# Patient Record
Sex: MALE | Race: WHITE | Employment: FULL TIME | URBAN - METROPOLITAN AREA
[De-identification: names, ages, dates, MRNs, and addresses within clinical notes are randomized per-mention and may not be internally consistent; named-entity substitution may affect disease eponyms.]

---

## 2021-07-15 ENCOUNTER — HOSPITAL ENCOUNTER (EMERGENCY)
Age: 31
Discharge: HOME | End: 2021-07-15
Attending: EMERGENCY MEDICINE
Payer: COMMERCIAL

## 2021-07-15 VITALS
DIASTOLIC BLOOD PRESSURE: 71 MMHG | SYSTOLIC BLOOD PRESSURE: 142 MMHG | RESPIRATION RATE: 16 BRPM | HEIGHT: 73 IN | OXYGEN SATURATION: 96 % | BODY MASS INDEX: 26.67 KG/M2 | WEIGHT: 201.25 LBS | HEART RATE: 59 BPM | TEMPERATURE: 98.2 F

## 2021-07-15 DIAGNOSIS — K05.30 PERICORONITIS: Primary | ICD-10-CM

## 2021-07-15 DIAGNOSIS — K08.89 ODONTALGIA: ICD-10-CM

## 2021-07-15 PROCEDURE — 99283 EMERGENCY DEPT VISIT LOW MDM: CPT

## 2021-07-15 RX ORDER — AMOXICILLIN 500 MG/1
500 TABLET, FILM COATED ORAL 3 TIMES DAILY
Qty: 30 TABLET | Refills: 0 | Status: SHIPPED | OUTPATIENT
Start: 2021-07-15 | End: 2021-07-25

## 2021-07-15 RX ORDER — IBUPROFEN 600 MG/1
600 TABLET ORAL EVERY 8 HOURS PRN
Qty: 20 TABLET | Refills: 0 | Status: SHIPPED | OUTPATIENT
Start: 2021-07-15

## 2021-07-15 ASSESSMENT — PAIN DESCRIPTION - LOCATION: LOCATION: MOUTH

## 2021-07-15 ASSESSMENT — PAIN SCALES - GENERAL: PAINLEVEL_OUTOF10: 6

## 2021-07-15 ASSESSMENT — PAIN DESCRIPTION - DESCRIPTORS: DESCRIPTORS: THROBBING

## 2021-07-15 ASSESSMENT — PAIN DESCRIPTION - ORIENTATION: ORIENTATION: RIGHT

## 2021-07-15 ASSESSMENT — PAIN DESCRIPTION - PAIN TYPE: TYPE: ACUTE PAIN

## 2021-07-15 NOTE — ED TRIAGE NOTES
Pt c/o right sided throat pain with swallowing, R sided swelling inside of mouth. Pt states it began overnight.

## 2021-07-15 NOTE — ED PROVIDER NOTES
TRIAGE CHIEF COMPLAINT:   Chief Complaint   Patient presents with    Dental Pain    Oral Swelling         HPI: Jess Khan is a 28 y.o. male who presents to the Emergency Department with complaint of right lower dental pain that started during the night. No known injury. Patient states the pain is at the most posterior right lower tooth. Several months ago he had the left lower third molar removed by his dentist.  He denies fever or chills. He states it is painful to open his mouth. He has no pain under the tongue. No specific sore throat. No fever or chills. Denies any neck pain, chest pain or shortness of breath. REVIEW OF SYSTEMS:  6 systems reviewed. Pertinent positives per HPI. Otherwise noted to be negative. Nursing notes reviewed and agree with above. Past medical/surgical history reviewed. MEDICATIONS   Patient's Medications    No medications on file         ALLERGIES No Known Allergies      BP (!) 142/71   Pulse 59   Temp 98.2 °F (36.8 °C) (Oral)   Resp 16   Ht 6' 1\" (1.854 m)   Wt 201 lb 4 oz (91.3 kg)   SpO2 96%   BMI 26.55 kg/m²   General:  No acute distress. Non toxic appearance  Head:   Normocephalic and atraumatic  Eyes:   Conjunctiva clear, GIDEON, EOM's intact. ENT:   There is no facial swelling. Minimal trismus noted secondary to pain. Posterior pharynx shows no exudate redness or swelling. The floor the mouth is normal.  There is no stridor. He swallows easily. He has evidence of pericoronitis of the right lower third molar with tenderness to palpation. No evidence of abscess or dental decay. Neck:   Supple. No adenopathy. No meningismus. Lungs/Chest:  No respiratory distress  CVS:   Regular rate and rhythm  Extremities:  Full range of motion  Skin:   No rashes or lesions to exposed skin  Neuro:  Alert and OX3. Speech clear and appropriate. No extremity weakness. Normal sensation in all extremities. No facial asymmetry.  Gait normal.  Psych:   Affect normal. Mood normal        RADIOLOGY      LAB      ED COURSE / MDM:  81-year-old male with pericoronitis of the right lower third molar presenting with pain. No respiratory distress. No neck pain, chest pain or shortness of breath. The floor the mouth is normal.  No visible abscess or decay. Tooth is tender to palpation with some hypertrophy of the gum over the tooth. Posterior pharynx is completely normal.  I recommended amoxicillin and frequent warm salt water gargles/rinses especially after eating to remove any food particles from around the tooth. Advised Tylenol or ibuprofen if needed for pain. Recommended he contact his dentist for follow-up as he may need removal of this tooth. I discussed with Puja Swati the results of evaluation in the Emergency Department, diagnosis, care and prognosis. The plan is to discharge to home. The patient is in agreement with the plan and questions have been answered. I also discussed with the patient and/or family the reasons which may require a return visit and the importance of follow-up care.        (Please note that portions of this note may have been completed with a voice recognition program.  Efforts were made to edit the dictation but occasionally words are mis-transcribed)      FINAL IMPRESSION:  1 --perichorionitis right lower third molar  2 --odontalgia     Nikko Sosa MD  07/15/21 1105

## 2024-01-24 ENCOUNTER — HOSPITAL ENCOUNTER (OUTPATIENT)
Dept: GENERAL RADIOLOGY | Age: 34
Discharge: HOME | End: 2024-01-24

## 2024-01-24 ENCOUNTER — HOSPITAL ENCOUNTER (OUTPATIENT)
Age: 34
Discharge: HOME | End: 2024-01-24

## 2024-01-24 DIAGNOSIS — G44.89 OTHER HEADACHE SYNDROME: ICD-10-CM

## 2024-01-24 DIAGNOSIS — S00.81XA ABRASION OF FOREHEAD, INITIAL ENCOUNTER: ICD-10-CM

## 2024-01-24 DIAGNOSIS — S00.83XA CONTUSION OF FOREHEAD, INITIAL ENCOUNTER: ICD-10-CM

## 2024-01-24 PROCEDURE — 70150 X-RAY EXAM OF FACIAL BONES: CPT

## 2024-01-29 ENCOUNTER — OFFICE VISIT (OUTPATIENT)
Dept: ORTHOPEDIC SURGERY | Age: 34
End: 2024-01-29
Payer: COMMERCIAL

## 2024-01-29 VITALS — HEIGHT: 73 IN | WEIGHT: 216 LBS | BODY MASS INDEX: 28.63 KG/M2

## 2024-01-29 DIAGNOSIS — M79.601 RIGHT ARM PAIN: ICD-10-CM

## 2024-01-29 DIAGNOSIS — M77.01 MEDIAL EPICONDYLITIS OF ELBOW, RIGHT: Primary | ICD-10-CM

## 2024-01-29 PROCEDURE — 99204 OFFICE O/P NEW MOD 45 MIN: CPT | Performed by: EMERGENCY MEDICINE

## 2024-01-29 RX ORDER — METHYLPREDNISOLONE 4 MG/1
TABLET ORAL
Qty: 1 KIT | Refills: 0 | Status: SHIPPED | OUTPATIENT
Start: 2024-01-29

## 2024-01-29 RX ORDER — ATORVASTATIN CALCIUM 10 MG/1
10 TABLET, FILM COATED ORAL DAILY
COMMUNITY
Start: 2023-06-16

## 2024-01-29 RX ORDER — BUPROPION HYDROCHLORIDE 150 MG/1
150 TABLET ORAL DAILY
COMMUNITY
Start: 2023-06-09

## 2024-01-29 RX ORDER — OMEPRAZOLE 40 MG/1
40 CAPSULE, DELAYED RELEASE ORAL DAILY
COMMUNITY
Start: 2023-06-09

## 2024-01-29 ASSESSMENT — ENCOUNTER SYMPTOMS
SHORTNESS OF BREATH: 0
ABDOMINAL PAIN: 0

## 2024-01-29 NOTE — PROGRESS NOTES
NEW PATIENT VISIT  CC: Arm Pain (RIGHT ARM PAIN)    Referring Provider: No ref. provider found    HPI:    Pillo Luo is a 33 y.o. male who presents for evaluation of right elbow pain.  Patient is right-hand dominant.  He reports 1 month of symptoms.  He states that initially \"it felt like I pulled something.\"  However, the pain never really went away.  He states that the pain is worse when compared using a computer mouse.  He also reports worsening pain with any lifting.  He states that sometimes the pain goes from his elbow to his wrist.  He does occasionally get some skin sensitivity.  No numbness or tingling.  No skin color changes.  He has been taking naproxen occasionally, which does improve his symptoms.  He denies any neck pain.  No shoulder pain.  He does not think that he has any particular weakness but he thinks that he feels weak secondary to the pain.  He denies any other complaints.    Past Medical History:   Diagnosis Date    ADHD (attention deficit hyperactivity disorder)     Asthma        Social History  Works as a     Medications  Current Outpatient Medications   Medication Sig Dispense Refill    atorvastatin (LIPITOR) 10 MG tablet Take 1 tablet by mouth daily      buPROPion (WELLBUTRIN XL) 150 MG extended release tablet Take 1 tablet by mouth daily      omeprazole (PRILOSEC) 40 MG delayed release capsule Take 1 capsule by mouth daily      methylPREDNISolone (MEDROL, CHERELLE,) 4 MG tablet Take by mouth. 1 kit 0     No current facility-administered medications for this visit.       Allergies  No Known Allergies    Review of Systems:  Review of Systems   Constitutional:  Negative for activity change.   Respiratory:  Negative for shortness of breath.    Cardiovascular:  Negative for chest pain.   Gastrointestinal:  Negative for abdominal pain.   Musculoskeletal:  Negative for joint swelling and myalgias.        Medial elbow pain   Skin:  Negative for rash.   Allergic/Immunologic:

## 2024-01-29 NOTE — ASSESSMENT & PLAN NOTE
Patient is seen and examined in the clinic today.  He is right-hand dominant presenting with right medial elbow pain.  He reports pain worse with lifting.    On exam, he has tenderness to palpation over the medial epicondyles with pain at the medial epicondyles with resisted flexion at the wrist.    X-rays of the right elbow are negative for acute pathology.    Clinical presentation seems most likely consistent with medial epicondylitis.  We discussed multiple treatment options including NSAIDs, steroids, physical therapy, steroid injection.  Ultimately, patient was agreeable to a Medrol Dosepak.  Upon completion of the Medrol Dosepak, naproxen twice daily.  Formal physical therapy.  Follow-up in 2 weeks.

## 2024-01-29 NOTE — ASSESSMENT & PLAN NOTE
Patient is also describing symptoms of skin sensitivity over the medial forearm.    On exam, he does have a positive Tinel's of the cubital tunnel.    Concern for possible cubital tunnel.  Therefore, I would like to proceed with an EMG of the right upper extremity.

## 2024-02-26 ENCOUNTER — OFFICE VISIT (OUTPATIENT)
Dept: ORTHOPEDIC SURGERY | Age: 34
End: 2024-02-26
Payer: COMMERCIAL

## 2024-02-26 VITALS — HEIGHT: 73 IN | WEIGHT: 216 LBS | BODY MASS INDEX: 28.63 KG/M2

## 2024-02-26 DIAGNOSIS — M77.01 MEDIAL EPICONDYLITIS OF ELBOW, RIGHT: ICD-10-CM

## 2024-02-26 DIAGNOSIS — M79.601 RIGHT ARM PAIN: Primary | ICD-10-CM

## 2024-02-26 PROCEDURE — 4004F PT TOBACCO SCREEN RCVD TLK: CPT | Performed by: EMERGENCY MEDICINE

## 2024-02-26 PROCEDURE — G8484 FLU IMMUNIZE NO ADMIN: HCPCS | Performed by: EMERGENCY MEDICINE

## 2024-02-26 PROCEDURE — G8427 DOCREV CUR MEDS BY ELIG CLIN: HCPCS | Performed by: EMERGENCY MEDICINE

## 2024-02-26 PROCEDURE — G8419 CALC BMI OUT NRM PARAM NOF/U: HCPCS | Performed by: EMERGENCY MEDICINE

## 2024-02-26 PROCEDURE — 99213 OFFICE O/P EST LOW 20 MIN: CPT | Performed by: EMERGENCY MEDICINE

## 2024-02-26 RX ORDER — DICLOFENAC SODIUM 75 MG/1
75 TABLET, DELAYED RELEASE ORAL 2 TIMES DAILY
Qty: 60 TABLET | Refills: 3 | Status: SHIPPED | OUTPATIENT
Start: 2024-02-26

## 2024-02-26 NOTE — ASSESSMENT & PLAN NOTE
Patient is seen and examined in the clinic today.  He is presenting with persistent medial elbow pain despite Medrol Dosepak.  Mild improvement of symptoms with naproxen.  He has not been in physical therapy.    EMG negative for cubital tunnel.    At this time, we discussed potential workup and treatment options.  As he does get a little bit of relief with naproxen, I would like to start him on prescribed, scheduled Voltaren.  A prescription was sent to the pharmacy.  Formal physical therapy.  We discussed adding on a steroid injection.  He would like to hold off at this time.  However, given the duration of his symptoms, I would like to proceed with an MRI of the right elbow.

## 2024-02-26 NOTE — PROGRESS NOTES
FOLLOW UP VISIT    Chief Complaint   Patient presents with    Arm Pain     RIGHT ELBOW-EMG REVIEW       HPI:    Pillo Luo is a 33 y.o. male who presents for EMG review.     At the last visit on 1/29/2024, patient was presenting with acute right elbow pain.  Concern for medial epicondylitis.  However, he was having some symptoms concerning for cubital tunnel so he was sent for EMG.  He was started on a Medrol Dosepak followed by naproxen and physical therapy was ordered.    Since the last visit, Pillo Luo has noted persistent symptoms.  At this time, pain is 4 out of 10 in severity.  However, symptoms do get worse with use of the right upper extremity.  He is right-hand dominant.  This does seem to be affecting his work.  He occasionally takes naproxen, which \"takes the edge off.\"  He did not get much relief from the steroid.  He has not yet been in physical therapy.    Medical History  Patient's medications, allergies, past medical, surgical, social, and family histories were reviewed and updated as appropriate.    Physical Examination:  General: Well appearing male, in no acute distress  Respiratory: Normal respiratory effort  Cardiovascular: No visual or palpable edema  Skin: no identified rashes, no induration, erythema or cyanosis  Neurologic: Light touch sensation is intact, no allodynia or hyperalgesia  Gait: Normal gait and station  Extremities: No evidence of clubbing, cyanosis, tenosynovitis or nail pitting  MSK:  Right elbow  Inspection/Palpation: Tenderness to palpation over the medial epicondyle, tenderness to palpation over the cubital tunnel, no swelling, no overlying skin changes or warmth  ROM: Full range of motion without pain  Stability: No ligamentous instability  Strength/Tone: 5/5 strength, pain at the medial epicondyle with resisted flexion at the wrist  Special Tests: Positive Tinel's of the cubital tunnel      Radiology:  EMG of the right upper extremity dated 2/19/2024 were

## 2024-04-01 ENCOUNTER — OFFICE VISIT (OUTPATIENT)
Dept: ORTHOPEDIC SURGERY | Age: 34
End: 2024-04-01

## 2024-04-01 VITALS — HEIGHT: 73 IN | BODY MASS INDEX: 28.63 KG/M2 | WEIGHT: 216 LBS

## 2024-04-01 DIAGNOSIS — M79.601 RIGHT ARM PAIN: ICD-10-CM

## 2024-04-01 DIAGNOSIS — M77.01 MEDIAL EPICONDYLITIS OF ELBOW, RIGHT: Primary | ICD-10-CM

## 2024-04-01 RX ORDER — BUPIVACAINE HYDROCHLORIDE 5 MG/ML
30 INJECTION, SOLUTION PERINEURAL ONCE
Status: COMPLETED | OUTPATIENT
Start: 2024-04-01 | End: 2024-04-01

## 2024-04-01 RX ORDER — METHYLPREDNISOLONE ACETATE 40 MG/ML
40 INJECTION, SUSPENSION INTRA-ARTICULAR; INTRALESIONAL; INTRAMUSCULAR; SOFT TISSUE ONCE
Status: COMPLETED | OUTPATIENT
Start: 2024-04-01 | End: 2024-04-01

## 2024-04-01 RX ORDER — LIDOCAINE HYDROCHLORIDE 10 MG/ML
5 INJECTION, SOLUTION INFILTRATION; PERINEURAL ONCE
Status: COMPLETED | OUTPATIENT
Start: 2024-04-01 | End: 2024-04-01

## 2024-04-01 RX ADMIN — METHYLPREDNISOLONE ACETATE 40 MG: 40 INJECTION, SUSPENSION INTRA-ARTICULAR; INTRALESIONAL; INTRAMUSCULAR; SOFT TISSUE at 10:41

## 2024-04-01 RX ADMIN — LIDOCAINE HYDROCHLORIDE 5 ML: 10 INJECTION, SOLUTION INFILTRATION; PERINEURAL at 10:43

## 2024-04-01 RX ADMIN — BUPIVACAINE HYDROCHLORIDE 50 MG: 5 INJECTION, SOLUTION PERINEURAL at 10:35

## 2024-04-01 NOTE — PROGRESS NOTES
subcutaneous edema, which are findings compatible with medial epicondylitis.    Assessment/Treatment Plan: Pillo Luo is a 34 y.o. male with:    1. Medial epicondylitis of elbow, right  Assessment & Plan:   MRI was reviewed with the patient, which confirmed medial epicondylitis.  Patient has not gotten much relief with conservative management.  Therefore, we discussed doing a steroid injection today.  Patient elected for steroid injection.  This was performed under ultrasound guidance as noted below.  We did discuss the importance of physical therapy.  I will see him back in 1 month.  Orders:  -     US GUIDED NEEDLE PLACEMENT; Future  -     lidocaine 1 % injection 5 mL; 5 mL, IntraDERmal, ONCE, 1 dose, On Mon 4/1/24 at 0930  -     BUPivacaine (MARCAINE) 0.5 % injection 150 mg; 150 mg (30 mL), IntraDERmal, ONCE, 1 dose, On Mon 4/1/24 at 0930  -     methylPREDNISolone acetate (DEPO-MEDROL) injection 40 mg; 40 mg, IntraMUSCular, ONCE, 1 dose, On Mon 4/1/24 at 0930  -     HI ARTHROCENTESIS ASPIR&/INJ INTERM JT/BURS W/O US  -     Ambulatory referral to Physical Therapy  2. Right arm pain  -     US GUIDED NEEDLE PLACEMENT; Future  -     lidocaine 1 % injection 5 mL; 5 mL, IntraDERmal, ONCE, 1 dose, On Mon 4/1/24 at 0930  -     BUPivacaine (MARCAINE) 0.5 % injection 150 mg; 150 mg (30 mL), IntraDERmal, ONCE, 1 dose, On Mon 4/1/24 at 0930  -     methylPREDNISolone acetate (DEPO-MEDROL) injection 40 mg; 40 mg, IntraMUSCular, ONCE, 1 dose, On Mon 4/1/24 at 0930  -     HI ARTHROCENTESIS ASPIR&/INJ INTERM JT/BURS W/O US      Procedure: Medial Epicondylitis Injection Under MSK Ultrasound Guidance  Side: Right  Equipment: WhipCarUBE with curvilinear array transducer (C1-6)  Injectant: 1ml of 1% lidocaine and 1ml of 0.5% bupivacaine and 0.5ml 40mg/ml methylprednisolone  Indication: Medial elbow pain secondary to medial epicondylitis    Patient was informed of the possible risks and benefits of injection prior to the

## 2024-04-01 NOTE — ASSESSMENT & PLAN NOTE
MRI was reviewed with the patient, which confirmed medial epicondylitis.  Patient has not gotten much relief with conservative management.  Therefore, we discussed doing a steroid injection today.  Patient elected for steroid injection.  This was performed under ultrasound guidance as noted below.  We did discuss the importance of physical therapy.  I will see him back in 1 month.

## 2024-04-09 ENCOUNTER — HOSPITAL ENCOUNTER (OUTPATIENT)
Dept: PHYSICAL THERAPY | Age: 34
Setting detail: THERAPIES SERIES
Discharge: HOME | End: 2024-04-09
Payer: COMMERCIAL

## 2024-04-09 DIAGNOSIS — M25.521 RIGHT ELBOW PAIN: Primary | ICD-10-CM

## 2024-04-09 DIAGNOSIS — R29.898 DECREASED STRENGTH OF UPPER EXTREMITY: ICD-10-CM

## 2024-04-09 PROCEDURE — 97161 PT EVAL LOW COMPLEX 20 MIN: CPT

## 2024-04-09 PROCEDURE — 97110 THERAPEUTIC EXERCISES: CPT

## 2024-04-09 NOTE — PLAN OF CARE
[facility] - Outpatient Rehabilitation and Therapy [address] [place] [id number] office: [phone] fax: [phone]     Physical Therapy Initial Evaluation Certification      Dear Greta Walton MD,    We had the pleasure of evaluating the following patient for physical therapy services at [facility].  A summary of our findings can be found in the initial assessment below.  This includes our plan of care.  If you have any questions or concerns regarding these findings, please do not hesitate to contact me at the office phone number listed above.  Thank you for the referral.     Physician Signature:_______________________________Date:__________________  By signing above (or electronic signature), therapist’s plan is approved by physician       Physical Therapy: TREATMENT/PROGRESS NOTE   Patient: Pillo Luo (34 y.o. male)   Examination Date: 2024   :  1990 MRN: [id number]   Visit #: 1   Insurance Allowable Auth Needed   20 []Yes    []No    Insurance: Payor: [employer] / Plan: [employer] - CHOICE PLU / Product Type: *No Product type* /   Insurance ID: [id number] - (Commercial)  Secondary Insurance (if applicable):    Treatment Diagnosis:     ICD-10-CM    1. Right elbow pain  M25.521       2. Decreased strength of upper extremity  R29.898          Medical Diagnosis:  Medial epicondylitis of elbow, right [M77.01]   Referring Physician: Greta Walton MD  PCP: No primary care provider on file.     Plan of care signed (Y/N):     Date of Patient follow up with Physician:      Progress Report/POC: EVAL today  POC update due: (10 visits /OR AUTH LIMITS, whichever is less)  2024                                             Precautions/ Contra-indications:           Latex allergy:  NO  Pacemaker:    NO  Contraindications for Manipulation: None  Date of Surgery: NA  Other:    Red Flags:  None    C-SSRS Triggered by Intake

## 2024-04-11 ENCOUNTER — HOSPITAL ENCOUNTER (OUTPATIENT)
Dept: PHYSICAL THERAPY | Age: 34
Setting detail: THERAPIES SERIES
Discharge: HOME | End: 2024-04-11
Payer: COMMERCIAL

## 2024-04-11 PROCEDURE — 20560 NDL INSJ W/O NJX 1 OR 2 MUSC: CPT

## 2024-04-11 PROCEDURE — 97032 APPL MODALITY 1+ESTIM EA 15: CPT

## 2024-04-11 PROCEDURE — 97110 THERAPEUTIC EXERCISES: CPT

## 2024-04-11 NOTE — PLAN OF CARE
[facility] - Outpatient Rehabilitation and Therapy [address] [place] [address] office: [phone] fax: [phone]         Physical Therapy: TREATMENT/PROGRESS NOTE   Patient: Pillo Luo (34 y.o. male)   Examination Date: 2024   :  1990 MRN: [id number]   Visit #: 2   Insurance Allowable Auth Needed   20 []Yes    []No    Insurance: Payor: [employer] / Plan: [employer] - CHOICE PLU / Product Type: *No Product type* /   Insurance ID: [id number] - (Commercial)  Secondary Insurance (if applicable):    Treatment Diagnosis:     ICD-10-CM    1. Right elbow pain  M25.521       2. Decreased strength of upper extremity  R29.898          Medical Diagnosis:  Medial epicondylitis of elbow, right [M77.01]   Referring Physician: Greta Walton MD  PCP: No primary care provider on file.     Plan of care signed (Y/N):     Date of Patient follow up with Physician:      Progress Report/POC: NO  POC update due: (10 visits /OR AUTH LIMITS, whichever is less)  5/10/2024                                             Precautions/ Contra-indications:           Latex allergy:  NO  Pacemaker:    NO  Contraindications for Manipulation: None  Date of Surgery: NA  Other:    Red Flags:  None    C-SSRS Triggered by Intake questionnaire:   [x] No, Questionnaire did not trigger screening.   [] Yes, Patient intake triggered further evaluation      [] C-SSRS Screening completed  [] PCP notified via Plan of Care  [] Emergency services notified     Preferred Language for Healthcare:   [x] English       [] other:    SUBJECTIVE EXAMINATION     Patient stated complaint: Pillo states that he has been feeling a little better since last session. He has not had as much pain on the medial epicondyle. He reports he has done the interventions and is for DN this date.        Test used Initial score  2024   Pain Summary VAS 2-7    Functional questionnaire Quick DASH 40;41.67    Other:         
Yes - the patient is able to be screened

## 2024-04-15 ENCOUNTER — APPOINTMENT (OUTPATIENT)
Dept: PHYSICAL THERAPY | Age: 34
End: 2024-04-15
Payer: COMMERCIAL

## 2024-04-17 ENCOUNTER — HOSPITAL ENCOUNTER (OUTPATIENT)
Dept: PHYSICAL THERAPY | Age: 34
Setting detail: THERAPIES SERIES
Discharge: HOME | End: 2024-04-17
Payer: COMMERCIAL

## 2024-04-17 PROCEDURE — 97110 THERAPEUTIC EXERCISES: CPT

## 2024-04-17 PROCEDURE — 97530 THERAPEUTIC ACTIVITIES: CPT

## 2024-04-17 PROCEDURE — 97140 MANUAL THERAPY 1/> REGIONS: CPT

## 2024-04-17 NOTE — FLOWSHEET NOTE
[facility] - Outpatient Rehabilitation and Therapy [address] [place] [id number] office: [phone] fax: [phone]         Physical Therapy: TREATMENT/PROGRESS NOTE   Patient: Pillo Luo (34 y.o. male)   Examination Date: 2024   :  1990 MRN: [id number]   Visit #: 3   Insurance Allowable Auth Needed   20 []Yes    []No    Insurance: Payor: [employer] / Plan: [employer] - CHOICE PLU / Product Type: *No Product type* /   Insurance ID: [id number] - (Commercial)  Secondary Insurance (if applicable):    Treatment Diagnosis:     ICD-10-CM    1. Right elbow pain  M25.521       2. Decreased strength of upper extremity  R29.898          Medical Diagnosis:  Medial epicondylitis of elbow, right [M77.01]   Referring Physician: Greta Walton MD  PCP: No primary care provider on file.     Plan of care signed (Y/N):     Date of Patient follow up with Physician:      Progress Report/POC: NO  POC update due: (10 visits /OR AUTH LIMITS, whichever is less)  2024                                             Precautions/ Contra-indications:           Latex allergy:  NO  Pacemaker:    NO  Contraindications for Manipulation: None  Date of Surgery: NA  Other:    Red Flags:  None    C-SSRS Triggered by Intake questionnaire:   [x] No, Questionnaire did not trigger screening.   [] Yes, Patient intake triggered further evaluation      [] C-SSRS Screening completed  [] PCP notified via Plan of Care  [] Emergency services notified     Preferred Language for Healthcare:   [x] English       [] other:    SUBJECTIVE EXAMINATION     Patient stated complaint: Pillo states that his pain is significantly better. He reports no pain in his elbow at all today.        Test used Initial score  2024   Pain Summary VAS 2-7    Functional questionnaire Quick DASH 40;41.67    Other:              Pain:  Pain location: Elbow, medial anterior forearm  Patient describes pain to be

## 2024-04-23 ENCOUNTER — HOSPITAL ENCOUNTER (OUTPATIENT)
Dept: PHYSICAL THERAPY | Age: 34
Setting detail: THERAPIES SERIES
Discharge: HOME | End: 2024-04-23
Payer: COMMERCIAL

## 2024-04-23 PROCEDURE — 97110 THERAPEUTIC EXERCISES: CPT

## 2024-04-23 PROCEDURE — 97140 MANUAL THERAPY 1/> REGIONS: CPT

## 2024-04-23 NOTE — FLOWSHEET NOTE
CPT Code (TIMED) minutes # CPT Code (UNTIMED) #     Therex (00315)  32 2  EVAL:LOW (05190 - Typically 20 minutes face-to-face)     Neuromusc. Re-ed (32033)    Re-Eval (05199)     Manual (42077) 10 1  Estim Unattended (25269)     Ther. Act (35440)    Mech. Traction (11381)     Gait (29084)    Dry Needle 1-2 muscle (06914)     Aquatic Therex (05832)    Dry Needle 3+ muscle (20561)     Iontophoresis (62189)    VASO (57321)     Ultrasound (37124)    Group Therapy (08558)     Estim Attended (24676)    Canalith Repositioning (31513)     Other:    Other:    Total Timed Code Tx Minutes 42 3       Total Treatment Minutes 42        Charge Justification:  (04493) THERAPEUTIC EXERCISE - Provided verbal/tactile cueing for activities related to strengthening, flexibility, endurance, ROM performed to prevent loss of range of motion, maintain or improve muscular strength or increase flexibility, following either an injury or surgery.   (85123) HOME EXERCISE PROGRAM - Reviewed/Progressed HEP activities related to strengthening, flexibility, endurance, ROM performed to prevent loss of range of motion, maintain or improve muscular strength or increase flexibility, following either an injury or surgery.  (06780) NEUROMUSCULAR RE-EDUCATION - Therapeutic procedure, 1 or more areas, each 15 minutes; neuromuscular reeducation of movement, balance, coordination, kinesthetic sense, posture, and/or proprioception for sitting and/or standing activities  (38820) HOME EXERCISE PROGRAM - Reviewed/Progressed HEP activities related to neuromuscular reeducation of movement, balance, coordination, kinesthetic sense, posture, and/or proprioception for sitting and/or standing activities    (78979) THERAPEUTIC ACTIVITY - use of dynamic activities to improve functional performance. (Ex include squatting, ascending/descending stairs, walking, bending, lifting, catching, throwing, pushing, pulling, jumping.)  Direct, one on one contact, billed in